# Patient Record
Sex: MALE | Employment: FULL TIME | URBAN - METROPOLITAN AREA
[De-identification: names, ages, dates, MRNs, and addresses within clinical notes are randomized per-mention and may not be internally consistent; named-entity substitution may affect disease eponyms.]

---

## 2020-11-10 ENCOUNTER — OFFICE VISIT (OUTPATIENT)
Dept: INTERNAL MEDICINE | Facility: CLINIC | Age: 25
End: 2020-11-10
Payer: COMMERCIAL

## 2020-11-10 VITALS
HEIGHT: 71 IN | HEART RATE: 77 BPM | OXYGEN SATURATION: 100 % | DIASTOLIC BLOOD PRESSURE: 78 MMHG | SYSTOLIC BLOOD PRESSURE: 127 MMHG | WEIGHT: 173 LBS | BODY MASS INDEX: 24.22 KG/M2

## 2020-11-10 DIAGNOSIS — Z23 NEED FOR VACCINATION: Primary | ICD-10-CM

## 2020-11-10 PROCEDURE — 90686 IIV4 VACC NO PRSV 0.5 ML IM: CPT | Performed by: INTERNAL MEDICINE

## 2020-11-10 PROCEDURE — 90471 IMMUNIZATION ADMIN: CPT | Performed by: INTERNAL MEDICINE

## 2020-11-10 PROCEDURE — 99385 PREV VISIT NEW AGE 18-39: CPT | Mod: 25 | Performed by: INTERNAL MEDICINE

## 2020-11-10 RX ORDER — FLUTICASONE FUROATE 27.5 UG/1
SPRAY, METERED NASAL
COMMUNITY
End: 2020-11-10

## 2020-11-10 SDOH — HEALTH STABILITY: MENTAL HEALTH: HOW OFTEN DO YOU HAVE A DRINK CONTAINING ALCOHOL?: 4 OR MORE TIMES A WEEK

## 2020-11-10 SDOH — HEALTH STABILITY: MENTAL HEALTH: HOW MANY STANDARD DRINKS CONTAINING ALCOHOL DO YOU HAVE ON A TYPICAL DAY?: 3 OR 4

## 2020-11-10 SDOH — HEALTH STABILITY: MENTAL HEALTH: HOW OFTEN DO YOU HAVE 6 OR MORE DRINKS ON ONE OCCASION?: LESS THAN MONTHLY

## 2020-11-10 ASSESSMENT — MIFFLIN-ST. JEOR: SCORE: 1806.85

## 2020-11-10 ASSESSMENT — PAIN SCALES - GENERAL: PAINLEVEL: NO PAIN (0)

## 2020-11-10 NOTE — PROGRESS NOTES
Chief Complaint: Here to establish care    SUBJECTIVE    HPI:  Patient is a 24 year old male who presents today to establish care. His past medical history is significant for exercise-induced asthma. He requires occasional use of albuterol inhaler. He is well aware of his asthma triggers and does his best to avoid them. Past surgical history is positive for appendectomy at age 12 and mole removal at age 7. He denies any health concerns today.     Past Medical History:   Diagnosis Date     Uncomplicated asthma      PSH:  Appendectomy  Mole removal    Current Outpatient Medications   Medication     cetirizine (ZYRTEC) 10 MG tablet     fluticasone (FLONASE) 50 MCG/ACT nasal spray     albuterol (PROAIR HFA, PROVENTIL HFA, VENTOLIN HFA) 108 (90 BASE) MCG/ACT inhaler     budesonide-formoterol (SYMBICORT) 80-4.5 MCG/ACT inhaler     No current facility-administered medications for this visit.         Allergies   Allergen Reactions     Cats      Dogs      Dust Mites      Grass      Pollen Extract      Trees      Immunization History   Administered Date(s) Administered     FLU 6-35 months 10/15/2009, 10/01/2011, 10/15/2014     HPV Quadrivalent 02/05/2014     HPV9 08/10/2015, 08/02/2016     HepA-ped 2 Dose 02/25/2008, 01/19/2009     HepB, Unspecified 1997, 02/10/1998, 09/18/1998     Hib, Unspecified 02/10/1998, 04/28/1998, 06/15/1998, 03/22/1999     Historical DTP/aP 02/10/1998, 04/28/1998, 06/15/1998, 03/22/1999, 03/12/2003     Influenza (H1N1) 11/16/2009     Influenza (IIV3) PF 10/13/2004, 10/25/2005     Influenza Vaccine IM > 6 months Valent IIV4 11/27/2015, 10/05/2016, 11/10/2020     MMR 12/16/1998, 12/12/2001     Meningococcal (Bexsero ) 11/27/2015, 05/24/2018     Meningococcal (Menactra ) 01/19/2009     Meningococcal (Menveo ) 08/10/2015     Polio, Unspecified  02/10/1998, 04/28/1998, 03/22/1999     Poliovirus, inactivated (IPV) 03/12/2003     TDAP Vaccine (Boostrix) 01/19/2009, 05/24/2018     Typhoid Oral  "02/23/2015     Varicella 12/16/1998, 12/22/2006       SH:  Recently graduated from college in May. 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 Currently living with parents. Reports alcohol use one day a week. Tobacco use a few times a year (socially).     ROS:  Constitutional: No weight loss, fatigue, fever or night sweats.   HEENT: No blurred vision, tearing or redness. No hearing loss or tinnitus. No nasal congestion or epistaxis. No neck stiffness of pain.  Cardiac: No chest pain or palpitations.  Respiratory: No shortness of breath, cough, hemoptysis, or expectoration.  GI: No nausea, vomiting, diarrhea, constipation, melena, or hematochezia.  Genitourinary: No hesitancy, urgency, dysuria or hematuria.   Musculoskeletal: No joint pain, stiffness or swelling.  Skin/Integumentary: No rashes, dryness, color change or abnormalities of hair or nails  Neurological: No weakness or numbness of extremities, no headache, dizziness, no loss of consciousness.  Psych: No changes in memory, concentration or changes in activities.      OBJECTIVE  /78   Pulse 77   Ht 1.803 m (5' 11\")   Wt 78.5 kg (173 lb)   SpO2 100%   BMI 24.13 kg/m        Physical Exam:  General: Pleasant male, appears stated age in no acute distress.Alert and cooperative with examination.  HEENT: Head normocephalic without tenderness. Conjunctiva pink, sclera non-icteric. . External auditory canals clear. TMs intact without erythema. Nasopharynx mucosa pink and moist without polyps or septal deviation.  Trachea midline. No thyromegaly. No cervical lymphadenopathy.  Respiratory: Symmetric thoracic expansion. Lungs clear to auscultation without crackles, wheezes or rhonchi.   Cardiac: RRR, S1 and S2 auscultated without murmurs, click or rubs. No JVD. Peripheral pulses palpable and equal bilaterally.   GI: Abdomen soft and nontender to palpation. Bowel sounds audible x 4. No hepatomegaly or splenomegaly.  Musculoskeletal: Full range " of motion bilaterally without limitation. Strength equal bilaterally. No joint tenderness to palpation.  Skin: Warm and dry without rashes or lesions.   Neurologic: Alert and oriented.   Psych: Pleasant mood and affect.       ASSESSMENT/PLAN:  1. Immunizations: up to date. Patient will receive seasonal influenza vaccine today.  2. Health maintenance: patient declines baseline blood work, STI or HIV testing. Reviewed need for testicular self exams. No signs of alcohol or drug abuse     Recommend follow-up in 1 year.    Aleksandra Greene  Nurse Practitioner Student  [facility]      Staff note. I personally interviewed Mr. Michael and conducted a physical examination. I agree with the above assessment and plan.    LUIS FERNANDO Watson MD    Total time spent 30  minutes.  More than 50% of the time spent with Mr. Michael on counseling / coordinating his care

## 2020-11-10 NOTE — NURSING NOTE
Chief Complaint   Patient presents with     Physical     Establish Care     Kimberly Nissen, EMT at 1:04 PM on 11/10/2020

## 2021-04-11 ENCOUNTER — HEALTH MAINTENANCE LETTER (OUTPATIENT)
Age: 26
End: 2021-04-11

## 2021-09-25 ENCOUNTER — HEALTH MAINTENANCE LETTER (OUTPATIENT)
Age: 26
End: 2021-09-25

## 2022-05-07 ENCOUNTER — HEALTH MAINTENANCE LETTER (OUTPATIENT)
Age: 27
End: 2022-05-07

## 2023-01-07 ENCOUNTER — HEALTH MAINTENANCE LETTER (OUTPATIENT)
Age: 28
End: 2023-01-07

## 2023-06-02 ENCOUNTER — HEALTH MAINTENANCE LETTER (OUTPATIENT)
Age: 28
End: 2023-06-02

## 2024-07-19 ENCOUNTER — TRANSFERRED RECORDS (OUTPATIENT)
Dept: HEALTH INFORMATION MANAGEMENT | Facility: CLINIC | Age: 29
End: 2024-07-19

## 2024-08-23 ENCOUNTER — OFFICE VISIT (OUTPATIENT)
Dept: INTERNAL MEDICINE | Facility: CLINIC | Age: 29
End: 2024-08-23
Payer: COMMERCIAL

## 2024-08-23 VITALS
WEIGHT: 178.1 LBS | SYSTOLIC BLOOD PRESSURE: 119 MMHG | BODY MASS INDEX: 25.5 KG/M2 | HEART RATE: 62 BPM | DIASTOLIC BLOOD PRESSURE: 69 MMHG | OXYGEN SATURATION: 98 % | HEIGHT: 70 IN

## 2024-08-23 DIAGNOSIS — Z76.89 ENCOUNTER TO ESTABLISH CARE: Primary | ICD-10-CM

## 2024-08-23 DIAGNOSIS — R09.A2 GLOBUS SENSATION: ICD-10-CM

## 2024-08-23 DIAGNOSIS — R68.89 FREQUENTLY SICK: ICD-10-CM

## 2024-08-23 PROCEDURE — 99203 OFFICE O/P NEW LOW 30 MIN: CPT

## 2024-08-23 NOTE — PROGRESS NOTES
Assessment & Plan     Encounter to establish care  He lives in [place] but travels to [place] often, would like a care provider when here. Care established today.     Frequently sick  Immunoglobulin testing completed and within normal range. Last CBC normal. He eats a healthy diet, works out frequently without issue. He travels frequently, plane travel about once per month. Discussed wearing a mask on the plane, hand hygiene.     Globus sensation  Continued globus sensation without difficulty swallowing. Jaw swelling has resolved. He has not noticed any GERD symptoms, though started prilosec recently. Plan to continue prilosec, can schedule ENT visit. Discussed possibility of EGD if symptoms persist without improvement.       Return if symptoms worsen or fail to improve.    Carlyle Tsai is a 28 year old, presenting for the following health issues:  Establish Care        8/23/2024    11:48 AM   Additional Questions   Roomed by MR     History of Present Illness       Reason for visit:  General physical, long term throat pain, and lack of energy.  Symptom onset:  More than a month  Symptoms include:  Throat pain, jaw pain, lack of energy.  Symptom intensity:  Moderate  Symptom progression:  Staying the same  Had these symptoms before:  No   He is taking medications regularly.     Dante Michael presents to the clinic today to establish care. He has a history of exercise induced asthma and seasonal allergies. He works in [place], comes back to [place] frequently to visit family. Will be in [place] for 1-2 weeks when he is here.     Went to [place] a couple months ago. Got sick after coming back, throat problem. Globus sensation. Did not go away, but tapered down. Sick again 2-3 weeks ago, felt jaw pain with that illness. Went to urgent care on 7/31 for jaw pain/swelling, told likely reactive lymphadenopathy. Continued discomfort. Seems to flare sometimes. Does not notice any reflux, though he did start prilosec about a week ago. No  "difficulty swallowing foods/liquids. He did get an ENT referral, has not yet scheduled this.    He travels often, at least monthly via plane. Notes frequent colds. He did have immunoglobulin testing completed in July which were within a normal range.     Annual exam last completed 7/11/24 at [facility].     7/19/24:  CBC, CMP WNL  TSH WNL  IGG, IGM, IGA WNL  LDL 44, HDL 84, triglycerides 28  A1c 5    Review of Systems  Constitutional, HEENT, cardiovascular, pulmonary, gi and gu systems are negative, except as otherwise noted.      Objective    /69 (BP Location: Right arm, Patient Position: Sitting, Cuff Size: Adult Regular)   Pulse 62   Ht 1.787 m (5' 10.35\")   Wt 80.8 kg (178 lb 1.6 oz)   SpO2 98%   BMI 25.30 kg/m    Body mass index is 25.3 kg/m .  Physical Exam   GENERAL: alert and no distress  EYES: Eyes grossly normal to inspection  HENT: mouth without ulcers or lesions  NECK: no adenopathy, no asymmetry, masses, or scars  RESP: lungs clear to auscultation - no rales, rhonchi or wheezes  CV: regular rate and rhythm, normal S1 S2, no S3 or S4, no murmur, click or rub, no peripheral edema  PSYCH: mentation appears normal, affect normal/bright          Signed Electronically by: Carie Costa NP    "

## 2025-08-23 ENCOUNTER — HEALTH MAINTENANCE LETTER (OUTPATIENT)
Age: 30
End: 2025-08-23